# Patient Record
Sex: FEMALE | Race: AMERICAN INDIAN OR ALASKA NATIVE | NOT HISPANIC OR LATINO | URBAN - METROPOLITAN AREA
[De-identification: names, ages, dates, MRNs, and addresses within clinical notes are randomized per-mention and may not be internally consistent; named-entity substitution may affect disease eponyms.]

---
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
## 2022-09-06 ENCOUNTER — NON-APPOINTMENT (OUTPATIENT)
Age: 22
End: 2022-09-06

## 2022-10-28 ENCOUNTER — NON-APPOINTMENT (OUTPATIENT)
Age: 22
End: 2022-10-28

## 2022-10-28 ENCOUNTER — APPOINTMENT (OUTPATIENT)
Dept: OBGYN | Facility: CLINIC | Age: 22
End: 2022-10-28

## 2022-10-28 VITALS
SYSTOLIC BLOOD PRESSURE: 118 MMHG | TEMPERATURE: 97.7 F | HEIGHT: 68 IN | WEIGHT: 135 LBS | HEART RATE: 76 BPM | BODY MASS INDEX: 20.46 KG/M2 | DIASTOLIC BLOOD PRESSURE: 63 MMHG

## 2022-10-28 DIAGNOSIS — Z82.49 FAMILY HISTORY OF ISCHEMIC HEART DISEASE AND OTHER DISEASES OF THE CIRCULATORY SYSTEM: ICD-10-CM

## 2022-10-28 DIAGNOSIS — Z71.89 OTHER SPECIFIED COUNSELING: ICD-10-CM

## 2022-10-28 DIAGNOSIS — Z72.0 TOBACCO USE: ICD-10-CM

## 2022-10-28 DIAGNOSIS — Z78.9 OTHER SPECIFIED HEALTH STATUS: ICD-10-CM

## 2022-10-28 DIAGNOSIS — Z82.62 FAMILY HISTORY OF OSTEOPOROSIS: ICD-10-CM

## 2022-10-28 DIAGNOSIS — Z30.09 ENCOUNTER FOR OTHER GENERAL COUNSELING AND ADVICE ON CONTRACEPTION: ICD-10-CM

## 2022-10-28 DIAGNOSIS — R31.29 OTHER MICROSCOPIC HEMATURIA: ICD-10-CM

## 2022-10-28 DIAGNOSIS — Z87.42 PERSONAL HISTORY OF OTHER DISEASES OF THE FEMALE GENITAL TRACT: ICD-10-CM

## 2022-10-28 DIAGNOSIS — R87.612 LOW GRADE SQUAMOUS INTRAEPITHELIAL LESION ON CYTOLOGIC SMEAR OF CERVIX (LGSIL): ICD-10-CM

## 2022-10-28 DIAGNOSIS — N89.8 OTHER SPECIFIED NONINFLAMMATORY DISORDERS OF VAGINA: ICD-10-CM

## 2022-10-28 LAB
BILIRUB UR QL STRIP: NORMAL
CLARITY UR: CLEAR
COLLECTION METHOD: NORMAL
GLUCOSE UR-MCNC: NORMAL
HCG UR QL: 0.2 EU/DL
HCG UR QL: NEGATIVE
HGB UR QL STRIP.AUTO: ABNORMAL
KETONES UR-MCNC: NORMAL
LEUKOCYTE ESTERASE UR QL STRIP: NORMAL
NITRITE UR QL STRIP: NORMAL
PH UR STRIP: 5.5
PROT UR STRIP-MCNC: NORMAL
QUALITY CONTROL: YES
SP GR UR STRIP: >=1.03

## 2022-10-28 PROCEDURE — 81025 URINE PREGNANCY TEST: CPT

## 2022-10-28 PROCEDURE — 81003 URINALYSIS AUTO W/O SCOPE: CPT | Mod: QW

## 2022-10-28 PROCEDURE — 99204 OFFICE O/P NEW MOD 45 MIN: CPT

## 2022-10-30 PROBLEM — Z82.49 FAMILY HISTORY OF HYPERTENSION: Status: ACTIVE | Noted: 2022-10-28

## 2022-10-30 PROBLEM — N89.8 VAGINAL DISCHARGE: Status: ACTIVE | Noted: 2022-10-28

## 2022-10-30 PROBLEM — Z82.62 FAMILY HISTORY OF OSTEOPOROSIS: Status: ACTIVE | Noted: 2022-10-28

## 2022-10-30 PROBLEM — Z71.89 OTHER SPECIFIED COUNSELING: Status: ACTIVE | Noted: 2022-10-30

## 2022-10-30 PROBLEM — Z87.42 HISTORY OF ABNORMAL CERVICAL PAP SMEAR: Status: RESOLVED | Noted: 2022-10-30 | Resolved: 2022-10-30

## 2022-10-30 PROBLEM — Z72.0 OCCASIONAL CIGARETTE SMOKER: Status: ACTIVE | Noted: 2022-10-28

## 2022-10-30 PROBLEM — Z78.9 EXERCISES OCCASIONALLY: Status: ACTIVE | Noted: 2022-10-28

## 2022-10-30 PROBLEM — Z30.09 GENERAL COUNSELING AND ADVICE FOR CONTRACEPTIVE MANAGEMENT: Status: ACTIVE | Noted: 2022-10-30

## 2022-10-30 LAB — BACTERIA UR CULT: NORMAL

## 2022-10-30 RX ORDER — PHENAZOPYRIDINE HYDROCHLORIDE 200 MG/1
200 TABLET ORAL
Qty: 6 | Refills: 0 | Status: DISCONTINUED | COMMUNITY
Start: 2022-09-07

## 2022-10-30 RX ORDER — ATOVAQUONE AND PROGUANIL HYDROCHLORIDE 250; 100 MG/1; MG/1
250-100 TABLET, FILM COATED ORAL
Qty: 60 | Refills: 0 | Status: DISCONTINUED | COMMUNITY
Start: 2022-05-02

## 2022-10-30 RX ORDER — NITROFURANTOIN (MONOHYDRATE/MACROCRYSTALS) 25; 75 MG/1; MG/1
100 CAPSULE ORAL
Qty: 14 | Refills: 0 | Status: DISCONTINUED | COMMUNITY
Start: 2022-09-07

## 2022-10-30 RX ORDER — AZITHROMYCIN 500 MG/1
500 TABLET, FILM COATED ORAL
Qty: 6 | Refills: 0 | Status: DISCONTINUED | COMMUNITY
Start: 2022-05-02

## 2022-10-30 NOTE — DISCUSSION/SUMMARY
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

## 2022-10-30 NOTE — HISTORY OF PRESENT ILLNESS
[Y] : Patient is sexually active [N] : Patient denies prior pregnancies [Normal Amount/Duration] :  normal amount and duration [Frequency: Q ___ days] : menstrual periods occur approximately every [unfilled] days [Menarche Age: ____] : age at menarche was [unfilled] [No] : Patient does not have concerns regarding sex [Currently Active] : currently active [Men] : men [Yes] : Yes [Condoms] : Condoms [Patient reported PAP Smear was abnormal] : Patient reported PAP Smear was abnormal [Chlamydia test offered] : Chlamydia test offered [Gonorrhea test offered] : Gonorrhea test offered [Trichomonas test offered] : Trichomonas test offered [HPV test offered] : HPV test offered [TextBox_4] : 22-year-old G0 with LMP 10/10/2022 came for GYN exam and repeat Pap smear due to most recent Pap smear showing LGSIL in February of this year.  She denies abnormal uterine bleeding or pelvic pain.  She does complain of vaginal discharge that is white and thick but no odor and some mild irritation but intermittent.  She also complains of having recurrent UTIs.  She states she had 2 UTIs in 2021 and also was diagnosed with Candida and bacterial vaginosis within a 2-month span in July 2021.  She states in February 2022 and also September 2022 she was again diagnosed and treated for a UTI.  She states her symptoms of dysuria, itching and discharge are random and intermittent but she also notices it more afternoon with intercourse.  She also states on a single occasion she noted bleeding with intercourse in September.  She denies other STDs, or fibroids.  She may have had an ovarian cyst rupture about a year ago when she had pain and went to the emergency room but there was only free fluid and that was the presumption.  Never had recurrent pain or imaging.  She is sexually active with male partners and uses condoms for contraception.  After counseling patient is not interested in any other form of contraception at this time. [PapSmeardate] : 2/2022 [LMPDate] : 10/10/22 [TextBox_31] : LGSIL [MensesFreq] : 28 [MensesLength] : 4-5 [MensesAmount] : mod [PGHxTotal] : 0 [FreeTextEntry1] : 10/10/22

## 2022-10-30 NOTE — PHYSICAL EXAM
[Appropriately responsive] : appropriately responsive [Alert] : alert [No Acute Distress] : no acute distress [Regular Rate Rhythm] : regular rate rhythm [Soft] : soft [Non-tender] : non-tender [Non-distended] : non-distended [No Mass] : no mass [Oriented x3] : oriented x3 [No Lesions] : no lesions  [Labia Majora] : normal [Labia Minora] : normal [Normal] : normal [No Bleeding] : There was no active vaginal bleeding [Tenderness] : nontender [Enlarged ___ wks] : not enlarged [Mass ___ cm] : no uterine mass was palpated [Uterine Adnexae] : normal [FreeTextEntry4] : Minimal discharge-no odor [FreeTextEntry5] : Pap smear done

## 2022-11-01 LAB — HPV HIGH+LOW RISK DNA PNL CVX: NOT DETECTED

## 2022-11-14 LAB — CYTOLOGY CVX/VAG DOC THIN PREP: NORMAL

## 2022-11-29 ENCOUNTER — APPOINTMENT (OUTPATIENT)
Dept: UROGYNECOLOGY | Facility: CLINIC | Age: 22
End: 2022-11-29
Payer: COMMERCIAL

## 2022-11-29 VITALS
WEIGHT: 135 LBS | DIASTOLIC BLOOD PRESSURE: 77 MMHG | SYSTOLIC BLOOD PRESSURE: 122 MMHG | BODY MASS INDEX: 20.46 KG/M2 | HEART RATE: 76 BPM | HEIGHT: 68 IN

## 2022-11-29 DIAGNOSIS — F19.90 OTHER PSYCHOACTIVE SUBSTANCE USE, UNSPECIFIED, UNCOMPLICATED: ICD-10-CM

## 2022-11-29 DIAGNOSIS — R39.11 HESITANCY OF MICTURITION: ICD-10-CM

## 2022-11-29 DIAGNOSIS — R30.0 DYSURIA: ICD-10-CM

## 2022-11-29 DIAGNOSIS — Z87.440 PERSONAL HISTORY OF URINARY (TRACT) INFECTIONS: ICD-10-CM

## 2022-11-29 DIAGNOSIS — N39.0 URINARY TRACT INFECTION, SITE NOT SPECIFIED: ICD-10-CM

## 2022-11-29 PROCEDURE — 99205 OFFICE O/P NEW HI 60 MIN: CPT | Mod: 25

## 2022-11-29 PROCEDURE — 51701 INSERT BLADDER CATHETER: CPT

## 2022-11-29 PROCEDURE — 99215 OFFICE O/P EST HI 40 MIN: CPT | Mod: 25

## 2022-11-29 RX ORDER — CETIRIZINE HCL 10 MG
TABLET ORAL
Refills: 0 | Status: COMPLETED | COMMUNITY
End: 2022-11-29

## 2022-11-29 NOTE — ASSESSMENT
[FreeTextEntry1] : Recurrent UTI -\par Counseled patient regarding this diagnosis. Of note, patient's UCx results were not available at the time of her visit and were reviewed after completion of her visit. Counseled regarding possible etiologies and treatment options. As there is low frequency of her UTis at this time, will continue to monitor. Discussed possible risk factors. She will call the office if she has any other symptoms of UTI.\par \par Pelvic discomfort -\par Discussed possible etiology of this symptom with the patient. She has been having this symptom for the past 2-3 weeks. Will follow up on UA and UCx collected from cath today. Discussed avoiding potential bladder irritants for a short duration of time and provided patient with bladder diet. She will follow the bladder diet for about 1-2 weeks.\par \par She will follow up as needed.

## 2022-11-29 NOTE — COUNSELING
[FreeTextEntry1] : Please call if you have any symptoms of a UTI.\par \par For the next 2 weeks please avoid the following:\par \par 1. Coffee (Caffeinated and decaffeinated)\par \par 2. Teas (Caffeinated, decaffeinated, Ice tea, and green teas\par \par 3. All sodas (Caffeinated, decaffeinated, energy drinks)\par \par 4. All carbonated drinks including seltzer water\par \par 5. All citric fruit juices\par \par 6. Water with lemon or lime\par \par 7. Spicy foods\par \par 8. Tomato Sauce based foods\par \par 9. Chocolate and chocolate containing products\par \par 10. All alcohol\par

## 2022-11-29 NOTE — PHYSICAL EXAM
[Chaperone Present] : A chaperone was present in the examining room during all aspects of the physical examination [FreeTextEntry1] : Void: 80 cc\par \par PVR: 20 cc\par \par Urethra was prepped in sterile fashion and then a sterile 14F catheter was used by me to drain the bladder for her symptoms of urinary urgency. Patient tolerated the procedure well\par \par neg empty cough stress test\par \par no atrophy\par \par no urethral caruncle\par \par no vestibular tenderness\par \par no prolapse\par \par + urethral hypermobility\par \par no pelvic floor dysfunction\par \par no urethral tenderness\par \par no bladder tenderness\par \par normal appearing cervix\par \par normal sized uterus\par \par adnexa nonpalpable\par \par intact sacral nerves

## 2022-12-01 LAB
APPEARANCE: CLEAR
BILIRUBIN URINE: NEGATIVE
BLOOD URINE: NEGATIVE
COLOR: COLORLESS
GLUCOSE QUALITATIVE U: NEGATIVE
KETONES URINE: NEGATIVE
LEUKOCYTE ESTERASE URINE: NEGATIVE
NITRITE URINE: NEGATIVE
PH URINE: 7
PROTEIN URINE: NEGATIVE
SPECIFIC GRAVITY URINE: 1
UROBILINOGEN URINE: NORMAL

## 2022-12-04 LAB — URINE CULTURE <10: NORMAL
